# Patient Record
Sex: FEMALE | Race: ASIAN | NOT HISPANIC OR LATINO
[De-identification: names, ages, dates, MRNs, and addresses within clinical notes are randomized per-mention and may not be internally consistent; named-entity substitution may affect disease eponyms.]

---

## 2022-03-02 ENCOUNTER — APPOINTMENT (OUTPATIENT)
Dept: DERMATOLOGY | Facility: CLINIC | Age: 57
End: 2022-03-02
Payer: MEDICAID

## 2022-03-02 ENCOUNTER — LABORATORY RESULT (OUTPATIENT)
Age: 57
End: 2022-03-02

## 2022-03-02 VITALS — WEIGHT: 118 LBS | BODY MASS INDEX: 18.96 KG/M2 | HEIGHT: 66 IN

## 2022-03-02 DIAGNOSIS — D48.9 NEOPLASM OF UNCERTAIN BEHAVIOR, UNSPECIFIED: ICD-10-CM

## 2022-03-02 PROBLEM — Z00.00 ENCOUNTER FOR PREVENTIVE HEALTH EXAMINATION: Status: ACTIVE | Noted: 2022-03-02

## 2022-03-02 PROCEDURE — 99204 OFFICE O/P NEW MOD 45 MIN: CPT

## 2022-03-02 RX ORDER — MUPIROCIN 20 MG/G
2 OINTMENT TOPICAL
Qty: 1 | Refills: 0 | Status: ACTIVE | COMMUNITY
Start: 2022-03-02 | End: 1900-01-01

## 2022-03-03 ENCOUNTER — NON-APPOINTMENT (OUTPATIENT)
Age: 57
End: 2022-03-03

## 2022-03-03 DIAGNOSIS — B02.9 ZOSTER W/OUT COMPLICATIONS: ICD-10-CM

## 2022-03-03 RX ORDER — VALACYCLOVIR 1 G/1
1 TABLET, FILM COATED ORAL 3 TIMES DAILY
Qty: 21 | Refills: 0 | Status: ACTIVE | COMMUNITY
Start: 2022-03-03 | End: 1900-01-01

## 2022-03-24 ENCOUNTER — APPOINTMENT (OUTPATIENT)
Dept: DERMATOLOGY | Facility: CLINIC | Age: 57
End: 2022-03-24

## 2022-05-07 ENCOUNTER — NON-APPOINTMENT (OUTPATIENT)
Age: 57
End: 2022-05-07

## 2024-08-30 ENCOUNTER — OFFICE (OUTPATIENT)
Facility: LOCATION | Age: 59
Setting detail: OPHTHALMOLOGY
End: 2024-08-30
Payer: COMMERCIAL

## 2024-08-30 DIAGNOSIS — H25.13: ICD-10-CM

## 2024-08-30 DIAGNOSIS — H40.013: ICD-10-CM

## 2024-08-30 DIAGNOSIS — H16.223: ICD-10-CM

## 2024-08-30 PROCEDURE — 92250 FUNDUS PHOTOGRAPHY W/I&R: CPT | Performed by: OPTOMETRIST

## 2024-08-30 PROCEDURE — 99213 OFFICE O/P EST LOW 20 MIN: CPT | Performed by: OPTOMETRIST

## 2024-08-30 PROCEDURE — 92083 EXTENDED VISUAL FIELD XM: CPT | Performed by: OPTOMETRIST

## 2024-08-30 ASSESSMENT — CONFRONTATIONAL VISUAL FIELD TEST (CVF)
OS_FINDINGS: FULL
OD_FINDINGS: FULL

## 2025-05-05 ENCOUNTER — EMERGENCY (EMERGENCY)
Facility: HOSPITAL | Age: 60
LOS: 1 days | End: 2025-05-05
Attending: STUDENT IN AN ORGANIZED HEALTH CARE EDUCATION/TRAINING PROGRAM
Payer: COMMERCIAL

## 2025-05-05 VITALS
WEIGHT: 117.95 LBS | DIASTOLIC BLOOD PRESSURE: 77 MMHG | RESPIRATION RATE: 18 BRPM | SYSTOLIC BLOOD PRESSURE: 138 MMHG | TEMPERATURE: 98 F | HEART RATE: 68 BPM | OXYGEN SATURATION: 98 % | HEIGHT: 63 IN

## 2025-05-05 LAB — GAS PNL BLDV: SIGNIFICANT CHANGE UP

## 2025-05-05 PROCEDURE — 93010 ELECTROCARDIOGRAM REPORT: CPT

## 2025-05-05 PROCEDURE — 99285 EMERGENCY DEPT VISIT HI MDM: CPT

## 2025-05-05 RX ORDER — ASPIRIN 325 MG
324 TABLET ORAL ONCE
Refills: 0 | Status: COMPLETED | OUTPATIENT
Start: 2025-05-05 | End: 2025-05-05

## 2025-05-05 RX ADMIN — Medication 324 MILLIGRAM(S): at 23:55

## 2025-05-05 NOTE — ED PROVIDER NOTE - PROGRESS NOTE DETAILS
Kee Nichols, DO: Right-sided EKG showed submillimeter depressions in V5 V6, STEMI consult was placed, bedside POCUS shows no pericardial effusion, no wall motion abnormality, small left-sided pleural effusion

## 2025-05-05 NOTE — ED PROVIDER NOTE - ATTENDING CONTRIBUTION TO CARE
Kee Nichols DO: I have personally performed a face to face medical and diagnostic evaluation of the patient. I have discussed with and reviewed the Resident's and/or ACP's and/or Medical/PA/NP student's note and agree with the History, ROS, Physical Exam and MDM unless otherwise indicated. A brief summary of my personal evaluation and impression can be found below.    59-year-old female with no prior cardiac medical history, history of pulmonary fibrosis? (patient unsure), present ED for chest pain which started at approximately 1 PM.  Patient states that pain has been intermittent since then associated with leg weakness as well as diaphoresis no nausea or vomiting, patient states last episode of chest pain was approximately 10 minutes prior to arrival but has since subsided.  Patient states that currently she is without chest pain.  Patient had an abnormal EKG with submillimeter elevations in 1 and aVL, submillimeter ST depressions in 2 3 aVF as well as T wave inversions in V2 and V3, brought back immediately.  Patient denies similar chest pain in the past, no recent travel, no leg swelling.    CONSTITUTIONAL: NAD  SKIN: Warm dry, normal skin turgor  HEAD: NCAT  EYES: EOMI, PERRLA, no scleral icterus, conjunctiva pink  NECK: Supple; non tender. Full ROM.  CARD: RRR  RESP: No respiratory distress  ABD: non-distended, no abdominal tenderness  MSK: Full ROM, no leg swelling  PSYCH: Cooperative, appropriate.    With an irregular EKG which does not meet STEMI criteria however has some concerning features of possible ischemia, history of new onset chest pain with diaphoresis earlier concern for ACS, Less likely to be PE, no clinical signs of DVT.  Other causes of chest pain such as worsening pulmonary fibrosis infectious etiology less likely, not suspecting aortic pathology or pneumothorax at this time.  Will give aspirin, obtain chest x-ray, ACS workup.  If patient's chest pain returns we will get repeat EKG, patient likely to be admitted for provocative testing.

## 2025-05-06 ENCOUNTER — RESULT REVIEW (OUTPATIENT)
Age: 60
End: 2025-05-06

## 2025-05-06 VITALS
HEART RATE: 66 BPM | RESPIRATION RATE: 21 BRPM | SYSTOLIC BLOOD PRESSURE: 104 MMHG | TEMPERATURE: 98 F | OXYGEN SATURATION: 95 % | DIASTOLIC BLOOD PRESSURE: 60 MMHG

## 2025-05-06 LAB
A1C WITH ESTIMATED AVERAGE GLUCOSE RESULT: 5.7 % — HIGH (ref 4–5.6)
ADD ON TEST-SPECIMEN IN LAB: SIGNIFICANT CHANGE UP
ALBUMIN SERPL ELPH-MCNC: 4.2 G/DL — SIGNIFICANT CHANGE UP (ref 3.3–5)
ALP SERPL-CCNC: 66 U/L — SIGNIFICANT CHANGE UP (ref 40–120)
ALT FLD-CCNC: 14 U/L — SIGNIFICANT CHANGE UP (ref 10–45)
ANION GAP SERPL CALC-SCNC: 11 MMOL/L — SIGNIFICANT CHANGE UP (ref 5–17)
AST SERPL-CCNC: 19 U/L — SIGNIFICANT CHANGE UP (ref 10–40)
BASOPHILS # BLD AUTO: 0.04 K/UL — SIGNIFICANT CHANGE UP (ref 0–0.2)
BASOPHILS NFR BLD AUTO: 0.7 % — SIGNIFICANT CHANGE UP (ref 0–2)
BILIRUB SERPL-MCNC: 0.2 MG/DL — SIGNIFICANT CHANGE UP (ref 0.2–1.2)
BUN SERPL-MCNC: 14 MG/DL — SIGNIFICANT CHANGE UP (ref 7–23)
CALCIUM SERPL-MCNC: 9.3 MG/DL — SIGNIFICANT CHANGE UP (ref 8.4–10.5)
CHLORIDE SERPL-SCNC: 103 MMOL/L — SIGNIFICANT CHANGE UP (ref 96–108)
CHOLEST SERPL-MCNC: 198 MG/DL — SIGNIFICANT CHANGE UP
CO2 SERPL-SCNC: 25 MMOL/L — SIGNIFICANT CHANGE UP (ref 22–31)
CREAT SERPL-MCNC: 0.69 MG/DL — SIGNIFICANT CHANGE UP (ref 0.5–1.3)
D DIMER BLD IA.RAPID-MCNC: 346 NG/ML DDU — HIGH
EGFR: 99 ML/MIN/1.73M2 — SIGNIFICANT CHANGE UP
EGFR: 99 ML/MIN/1.73M2 — SIGNIFICANT CHANGE UP
EOSINOPHIL # BLD AUTO: 0.36 K/UL — SIGNIFICANT CHANGE UP (ref 0–0.5)
EOSINOPHIL NFR BLD AUTO: 6.2 % — HIGH (ref 0–6)
ESTIMATED AVERAGE GLUCOSE: 117 MG/DL — HIGH (ref 68–114)
GLUCOSE SERPL-MCNC: 111 MG/DL — HIGH (ref 70–99)
HCT VFR BLD CALC: 41.6 % — SIGNIFICANT CHANGE UP (ref 34.5–45)
HDLC SERPL-MCNC: 44 MG/DL — LOW
HGB BLD-MCNC: 14.1 G/DL — SIGNIFICANT CHANGE UP (ref 11.5–15.5)
IMM GRANULOCYTES NFR BLD AUTO: 0.7 % — SIGNIFICANT CHANGE UP (ref 0–0.9)
LDLC SERPL-MCNC: 141 MG/DL — HIGH
LIPID PNL WITH DIRECT LDL SERPL: 141 MG/DL — HIGH
LYMPHOCYTES # BLD AUTO: 2.51 K/UL — SIGNIFICANT CHANGE UP (ref 1–3.3)
LYMPHOCYTES # BLD AUTO: 42.9 % — SIGNIFICANT CHANGE UP (ref 13–44)
MCHC RBC-ENTMCNC: 30.2 PG — SIGNIFICANT CHANGE UP (ref 27–34)
MCHC RBC-ENTMCNC: 33.9 G/DL — SIGNIFICANT CHANGE UP (ref 32–36)
MCV RBC AUTO: 89.1 FL — SIGNIFICANT CHANGE UP (ref 80–100)
MONOCYTES # BLD AUTO: 0.67 K/UL — SIGNIFICANT CHANGE UP (ref 0–0.9)
MONOCYTES NFR BLD AUTO: 11.5 % — SIGNIFICANT CHANGE UP (ref 2–14)
NEUTROPHILS # BLD AUTO: 2.23 K/UL — SIGNIFICANT CHANGE UP (ref 1.8–7.4)
NEUTROPHILS NFR BLD AUTO: 38 % — LOW (ref 43–77)
NONHDLC SERPL-MCNC: 155 MG/DL — HIGH
NRBC BLD AUTO-RTO: 0 /100 WBCS — SIGNIFICANT CHANGE UP (ref 0–0)
PLATELET # BLD AUTO: 240 K/UL — SIGNIFICANT CHANGE UP (ref 150–400)
POTASSIUM SERPL-MCNC: 4 MMOL/L — SIGNIFICANT CHANGE UP (ref 3.5–5.3)
POTASSIUM SERPL-SCNC: 4 MMOL/L — SIGNIFICANT CHANGE UP (ref 3.5–5.3)
PROT SERPL-MCNC: 7.3 G/DL — SIGNIFICANT CHANGE UP (ref 6–8.3)
RBC # BLD: 4.67 M/UL — SIGNIFICANT CHANGE UP (ref 3.8–5.2)
RBC # FLD: 12.8 % — SIGNIFICANT CHANGE UP (ref 10.3–14.5)
SODIUM SERPL-SCNC: 139 MMOL/L — SIGNIFICANT CHANGE UP (ref 135–145)
TRIGL SERPL-MCNC: 78 MG/DL — SIGNIFICANT CHANGE UP
TROPONIN T, HIGH SENSITIVITY RESULT: <6 NG/L — SIGNIFICANT CHANGE UP (ref 0–51)
WBC # BLD: 5.85 K/UL — SIGNIFICANT CHANGE UP (ref 3.8–10.5)
WBC # FLD AUTO: 5.85 K/UL — SIGNIFICANT CHANGE UP (ref 3.8–10.5)

## 2025-05-06 PROCEDURE — 85379 FIBRIN DEGRADATION QUANT: CPT

## 2025-05-06 PROCEDURE — 80053 COMPREHEN METABOLIC PANEL: CPT

## 2025-05-06 PROCEDURE — 93005 ELECTROCARDIOGRAM TRACING: CPT | Mod: XU

## 2025-05-06 PROCEDURE — 82947 ASSAY GLUCOSE BLOOD QUANT: CPT

## 2025-05-06 PROCEDURE — 93356 MYOCRD STRAIN IMG SPCKL TRCK: CPT

## 2025-05-06 PROCEDURE — 93016 CV STRESS TEST SUPVJ ONLY: CPT

## 2025-05-06 PROCEDURE — 82553 CREATINE MB FRACTION: CPT

## 2025-05-06 PROCEDURE — 93017 CV STRESS TEST TRACING ONLY: CPT

## 2025-05-06 PROCEDURE — 83605 ASSAY OF LACTIC ACID: CPT

## 2025-05-06 PROCEDURE — 93306 TTE W/DOPPLER COMPLETE: CPT

## 2025-05-06 PROCEDURE — 84484 ASSAY OF TROPONIN QUANT: CPT

## 2025-05-06 PROCEDURE — 80061 LIPID PANEL: CPT

## 2025-05-06 PROCEDURE — 82435 ASSAY OF BLOOD CHLORIDE: CPT

## 2025-05-06 PROCEDURE — A9500: CPT

## 2025-05-06 PROCEDURE — 85025 COMPLETE CBC W/AUTO DIFF WBC: CPT

## 2025-05-06 PROCEDURE — 84132 ASSAY OF SERUM POTASSIUM: CPT

## 2025-05-06 PROCEDURE — 83036 HEMOGLOBIN GLYCOSYLATED A1C: CPT

## 2025-05-06 PROCEDURE — 93308 TTE F-UP OR LMTD: CPT

## 2025-05-06 PROCEDURE — 84295 ASSAY OF SERUM SODIUM: CPT

## 2025-05-06 PROCEDURE — 93018 CV STRESS TEST I&R ONLY: CPT

## 2025-05-06 PROCEDURE — 85014 HEMATOCRIT: CPT

## 2025-05-06 PROCEDURE — 82803 BLOOD GASES ANY COMBINATION: CPT

## 2025-05-06 PROCEDURE — 99285 EMERGENCY DEPT VISIT HI MDM: CPT | Mod: 25

## 2025-05-06 PROCEDURE — 93010 ELECTROCARDIOGRAM REPORT: CPT

## 2025-05-06 PROCEDURE — 99236 HOSP IP/OBS SAME DATE HI 85: CPT | Mod: 25

## 2025-05-06 PROCEDURE — 93306 TTE W/DOPPLER COMPLETE: CPT | Mod: 26

## 2025-05-06 PROCEDURE — 78452 HT MUSCLE IMAGE SPECT MULT: CPT | Mod: 26

## 2025-05-06 PROCEDURE — 85018 HEMOGLOBIN: CPT

## 2025-05-06 PROCEDURE — 93308 TTE F-UP OR LMTD: CPT | Mod: 26

## 2025-05-06 PROCEDURE — 71045 X-RAY EXAM CHEST 1 VIEW: CPT | Mod: 26

## 2025-05-06 PROCEDURE — 71275 CT ANGIOGRAPHY CHEST: CPT | Mod: 26

## 2025-05-06 PROCEDURE — 71275 CT ANGIOGRAPHY CHEST: CPT | Mod: MC

## 2025-05-06 PROCEDURE — 36415 COLL VENOUS BLD VENIPUNCTURE: CPT

## 2025-05-06 PROCEDURE — 78452 HT MUSCLE IMAGE SPECT MULT: CPT | Mod: MC

## 2025-05-06 PROCEDURE — G0378: CPT

## 2025-05-06 PROCEDURE — 82330 ASSAY OF CALCIUM: CPT

## 2025-05-06 PROCEDURE — 82550 ASSAY OF CK (CPK): CPT

## 2025-05-06 PROCEDURE — 71045 X-RAY EXAM CHEST 1 VIEW: CPT

## 2025-05-06 RX ADMIN — Medication 40 MILLIGRAM(S): at 06:33

## 2025-05-06 NOTE — CONSULT NOTE ADULT - SUBJECTIVE AND OBJECTIVE BOX
Cardiology Consult Note   [Please check [email or web address] password: [email or web address]" for cardiology service schedule and contact information]    HPI:  Gianluca Red is a 59 year old Woman with PMH of Pre-DM, HLD, ?ILD, ?GERD who comes in with 12 hours of intermittent midsternal chest pressure associated with a moment of "sweating" and leg weakness per son at bedside. Interview conducted via son as Bulgarian  at the request of patient.     She was in her usual state of health this AM which is described as fully functional, never short of breath with mild-moderate exertion, can walk up multiple flights of stairs, and without any history of coronary disease, any family history of MI, no tobacco, EtOH, or illicit drug use history. Then around 1 pm she developed this midsternal pressure sensation associated with a moment of sweating per son while she was hungry. The episode was brief and resolved but then later around 4-5 pm she had another chest pressure sensation. She had a third one and her older son recommended going to hospital.     In the ED, her chest pain resolved after the initial ECG was taken. The ECG shows submillimeter HEATHER in I, aVL with slight horizontal ST depressions in III, aVF. Right sided ECG was similar but showed TWI in K9H-W4X. She continued to remain asymptomatic. Bedside POCUS by ED performed and was witnessed by me: normal EF, no RWMA. /77 with one reading of 160s systolic. HR 60s NSR. Initial troponin came back <6. CBC WNL, CMP WNL with Cr 0.7. D-dimer elevated at 346. Lactate 1.1 WNL    She has no prior ECG or data at [facility] to review. Does not see a cardiologist outpatient.      Home Meds:  - PPI  - Famotidine    Allergies:  NKDA      ROS:  General: no fatigue, no lethargy  HEENT: No cough, no congestion  CV: as per HPI  Resp: as per HPI  GI: No nausea, no vomiting  MSK: no extremity pain, no decreased ROM  Skin: no rashes, no bruising  Neuro: no confusion, no focal deficits  Psych: no significant anxiety, no hallucinations    PAST MEDICAL & SURGICAL HISTORY:    FAMILY HISTORY:    SOCIAL HISTORY:  unchanged    MEDICATIONS:                    -------------------------------------------------------------------------------------------  PHYSICAL EXAM:  T(C): 36.7 (05-05-25 @ 23:24), Max: 36.7 (05-05-25 @ 23:24)  HR: 68 (05-05-25 @ 23:58) (68 - 68)  BP: 165/75 (05-05-25 @ 23:58) (138/77 - 165/75)  RR: 20 (05-05-25 @ 23:58) (18 - 20)  SpO2: 98% (05-05-25 @ 23:58) (98% - 98%)  Wt(kg): --  I&O's Summary      GENERAL: Patient is alert, awake, and in no acute distress  HEENT: Moist mucous membranes. No conjunctival injection. No JVD  CHEST/LUNG: Clear to auscultation bilaterally; No wheezing. Unlabored respirations.  HEART: Regular rate and rhythm; No murmurs. Radial pulses 2+.  ABDOMEN: Bowel sounds present; Soft, Nontender, Nondistended.   EXTREMITIES:  No lower extremity tenderness. No lower extremity edema.  NEURO: Aox3, no focal deficits    -------------------------------------------------------------------------------------------  LABS:                          14.1   5.85  )-----------( 240      ( 05 May 2025 23:53 )             41.6     05-05    139  |  103  |  14  ----------------------------<  111[H]  4.0   |  25  |  0.69    Ca    9.3      05 May 2025 23:53    TPro  7.3  /  Alb  4.2  /  TBili  0.2  /  DBili  x   /  AST  19  /  ALT  14  /  AlkPhos  66  05-05      CARDIAC MARKERS ( 05 May 2025 23:53 )  <6 ng/L / x     / x     / x     / x     / x                  -------------------------------------------------------------------------------------------    -------------------------------------------------------------------------------------------    Assessment and Recommendations:    #Chest Pain, possibly NSTEMI  Patient presenting with midsternal chest pressure that has been intermittent since 1 pm with most recent episode occurring prior to EMS being called and subsiding by the time she arrived in the waiting room. Her risk factors are 58 yo F, PreDM, HLD. Overall not a significant risk factor burden however will need to obtain delta troponin, obtain TTE, and some form of either anatomic or functional testing. Given her relatively younger age of 59, will opt for anatomic with CCTA if troponins remain WNL. If she has a repeat troponin that is also <6, given low-intermediate pre-test probability, by ACC guidelines this would essentially rule her out for ACS. Her ECG changes are interesting but without a baseline to compare to so may be nonspecific in setting of normal troponin and no RWMA on POCUS.   Recs:  - trend troponins q2-4 hours to peak, obtain CK, CKMB, CKMB%  - obtain proBNP  - repeat ECG with next troponin  - obtain formal TTE in AM   - monitor in CDU  - if troponins negative x2, please order CCTA w/ and w/out contrast (patients with ILD have higher rates of coronary disease)  - please obtain A1C, Lipid profile, LPa, TSH  - replete lytes for K >4.0, Mg >2.0  - please notify cardiology team if worsening chest pain, hemodynamic instability  - maintain active T&S, Hgb > 8; monitor for s/s of bleeding           These are preliminary recommendations. Please see attending attestation for final recommendations.  Cardiology Consult Note   [Please check [email or web address] password: [email or web address]" for cardiology service schedule and contact information]    HPI:  Gianluca Red is a 59 year old Woman with PMH of Pre-DM, HLD, ?ILD, ?GERD who comes in with 12 hours of intermittent midsternal chest pressure associated with a moment of "sweating" and leg weakness per son at bedside. Interview conducted via son as Divehi  at the request of patient.     She was in her usual state of health this AM which is described as fully functional, never short of breath with mild-moderate exertion, can walk up multiple flights of stairs, and without any history of coronary disease, any family history of MI, no tobacco, EtOH, or illicit drug use history. Then around 1 pm she developed this midsternal pressure sensation associated with a moment of sweating per son while she was hungry. The episode was brief and resolved but then later around 4-5 pm she had another chest pressure sensation. She had a third one and her older son recommended going to hospital.     In the ED, her chest pain resolved after the initial ECG was taken. The ECG shows submillimeter HEATHER in I, aVL with slight horizontal ST depressions in III, aVF. Right sided ECG was similar but showed TWI in B4L-P1Y. Cardiology called immediately at 12:01 for possible STEMI and rushed to bedside.    She continued to remain asymptomatic. Bedside POCUS by ED performed and was witnessed by me: normal EF, no RWMA. /77 with one reading of 160s systolic. HR 60s NSR. Initial troponin came back <6. CBC WNL, CMP WNL with Cr 0.7. D-dimer elevated at 346. Lactate 1.1 WNL    She has no prior ECG or [facility] to [facility]. Does not see a cardiologist outpatient.      Home Meds:  - PPI  - Famotidine    Allergies:  NKDA      ROS:  General: no fatigue, no lethargy  HEENT: No cough, no congestion  CV: as per HPI  Resp: as per HPI  GI: No nausea, no vomiting  MSK: no extremity pain, no decreased ROM  Skin: no rashes, no bruising  Neuro: no confusion, no focal deficits  Psych: no significant anxiety, no hallucinations    PAST MEDICAL & SURGICAL HISTORY:    FAMILY HISTORY:    SOCIAL HISTORY:  unchanged    MEDICATIONS:                    -------------------------------------------------------------------------------------------  PHYSICAL EXAM:  T(C): 36.7 (05-05-25 @ 23:24), Max: 36.7 (05-05-25 @ 23:24)  HR: 68 (05-05-25 @ 23:58) (68 - 68)  BP: 165/75 (05-05-25 @ 23:58) (138/77 - 165/75)  RR: 20 (05-05-25 @ 23:58) (18 - 20)  SpO2: 98% (05-05-25 @ 23:58) (98% - 98%)  Wt(kg): --  I&O's Summary      GENERAL: Patient is alert, awake, and in no acute distress  HEENT: Moist mucous membranes. No conjunctival injection. No JVD  CHEST/LUNG: Clear to auscultation bilaterally; No wheezing. Unlabored respirations.  HEART: Regular rate and rhythm; No murmurs. Radial pulses 2+.  ABDOMEN: Bowel sounds present; Soft, Nontender, Nondistended.   EXTREMITIES:  No lower extremity tenderness. No lower extremity edema.  NEURO: Aox3, no focal deficits    -------------------------------------------------------------------------------------------  LABS:                          14.1   5.85  )-----------( 240      ( 05 May 2025 23:53 )             41.6     05-05    139  |  103  |  14  ----------------------------<  111[H]  4.0   |  25  |  0.69    Ca    9.3      05 May 2025 23:53    TPro  7.3  /  Alb  4.2  /  TBili  0.2  /  DBili  x   /  AST  19  /  ALT  14  /  AlkPhos  66  05-05      CARDIAC MARKERS ( 05 May 2025 23:53 )  <6 ng/L / x     / x     / x     / x     / x                  -------------------------------------------------------------------------------------------    -------------------------------------------------------------------------------------------    Assessment and Recommendations:    #Chest Pain, possibly NSTEMI  Patient presenting with midsternal chest pressure that has been intermittent since 1 pm with most recent episode occurring prior to EMS being called and subsiding by the time she arrived in the waiting room. Her risk factors are 58 yo F, PreDM, HLD. Overall not a significant risk factor burden however will need to obtain delta troponin, obtain TTE, and some form of either anatomic or functional testing. Given her relatively younger age of 59, will opt for anatomic with CCTA if troponins remain WNL. If she has a repeat troponin that is also <6, given low-intermediate pre-test probability, by ACC guidelines this would essentially rule her out for ACS. Her ECG changes are interesting but without a baseline to compare to so may be nonspecific in setting of normal troponin and no RWMA on POCUS.   Recs:  - trend troponins q2-4 hours to peak, obtain CK, CKMB, CKMB%  - obtain proBNP  - repeat ECG with next troponin  - obtain formal TTE in AM   - monitor in CDU  - if troponins negative x2, please order CCTA w/ and w/out contrast (patients with ILD have higher rates of coronary disease)  - please obtain A1C, Lipid profile, LPa, TSH  - replete lytes for K >4.0, Mg >2.0  - please notify cardiology team if worsening chest pain, hemodynamic instability  - maintain active T&S, Hgb > 8; monitor for s/s of bleeding           These are preliminary recommendations. Please see attending attestation for final recommendations.  Cardiology Consult Note   [Please check [email or web address] password: "[email or web address]" for cardiology service schedule and contact information]    HPI:  Gianluca Red is a 59 year old Woman with PMH of Pre-DM, HLD, ?ILD, ?GERD who comes in with 12 hours of intermittent midsternal chest pressure associated with a moment of "sweating" and leg weakness per son at bedside. Interview conducted via son as Icelandic  at the request of patient.     She was in her usual state of health this AM which is described as fully functional, never short of breath with mild-moderate exertion, can walk up multiple flights of stairs, and without any history of coronary disease, any family history of MI, no tobacco, EtOH, or illicit drug use history. Then around 1 pm she developed this midsternal pressure sensation associated with a moment of sweating per son while she was hungry. The episode was brief and resolved but then later around 4-5 pm she had another chest pressure sensation. She had a third one and her older son recommended going to hospital.     In the ED, her chest pain resolved after the initial ECG was taken. The ECG shows submillimeter HETAHER in I, aVL with slight horizontal ST depressions in III, aVF. Right sided ECG was similar but showed TWI in L6T-W2T. Cardiology called immediately at 12:01 for possible STEMI and rushed to bedside.    She continued to remain asymptomatic. Bedside POCUS by ED performed and was witnessed by me: normal EF, no RWMA. /77 with one reading of 160s systolic. HR 60s NSR. Initial troponin came back <6. CBC WNL, CMP WNL with Cr 0.7. D-dimer elevated at 346. Lactate 1.1 WNL    She has no prior ECG or [facility] to [facility]. Does not see a cardiologist outpatient.      Home Meds:  - PPI  - Famotidine    Allergies:  NKDA      ROS:  General: no fatigue, no lethargy  HEENT: No cough, no congestion  CV: as per HPI  Resp: as per HPI  GI: No nausea, no vomiting  MSK: no extremity pain, no decreased ROM  Skin: no rashes, no bruising  Neuro: no confusion, no focal deficits  Psych: no significant anxiety, no hallucinations    PAST MEDICAL & SURGICAL HISTORY:    FAMILY HISTORY:    SOCIAL HISTORY:  unchanged    MEDICATIONS:                    -------------------------------------------------------------------------------------------  PHYSICAL EXAM:  T(C): 36.7 (05-05-25 @ 23:24), Max: 36.7 (05-05-25 @ 23:24)  HR: 68 (05-05-25 @ 23:58) (68 - 68)  BP: 165/75 (05-05-25 @ 23:58) (138/77 - 165/75)  RR: 20 (05-05-25 @ 23:58) (18 - 20)  SpO2: 98% (05-05-25 @ 23:58) (98% - 98%)  Wt(kg): --  I&O's Summary      GENERAL: Patient is alert, awake, and in no acute distress  HEENT: Moist mucous membranes. No conjunctival injection. No JVD  CHEST/LUNG: Clear to auscultation bilaterally; No wheezing. Unlabored respirations.  HEART: Regular rate and rhythm; No murmurs. Radial pulses 2+.  ABDOMEN: Bowel sounds present; Soft, Nontender, Nondistended.   EXTREMITIES:  No lower extremity tenderness. No lower extremity edema.  NEURO: Aox3, no focal deficits    -------------------------------------------------------------------------------------------  LABS:                          14.1   5.85  )-----------( 240      ( 05 May 2025 23:53 )             41.6     05-05    139  |  103  |  14  ----------------------------<  111[H]  4.0   |  25  |  0.69    Ca    9.3      05 May 2025 23:53    TPro  7.3  /  Alb  4.2  /  TBili  0.2  /  DBili  x   /  AST  19  /  ALT  14  /  AlkPhos  66  05-05      CARDIAC MARKERS ( 05 May 2025 23:53 )  <6 ng/L / x     / x     / x     / x     / x                  -------------------------------------------------------------------------------------------    -------------------------------------------------------------------------------------------    Assessment and Recommendations:    #Chest Pain, possibly NSTEMI  Patient presenting with midsternal chest pressure that has been intermittent since 1 pm with most recent episode occurring prior to EMS being called and subsiding by the time she arrived in the waiting room. Her risk factors are 58 yo F, PreDM, HLD. Overall not a significant risk factor burden however will need to obtain delta troponin, obtain TTE, and some form of either anatomic or functional testing. Given her relatively younger age of 59, will opt for anatomic with CCTA if troponins remain WNL. If she has a repeat troponin that is also <6, given low-intermediate pre-test probability, by ACC guidelines this would essentially rule her out for ACS. Her ECG changes are interesting but without a baseline to compare to so may be nonspecific in setting of normal troponin and no RWMA on POCUS.   Recs:  - trend troponins q2-4 hours to peak, obtain CK, CKMB, CKMB%  - obtain proBNP  - repeat ECG with next troponin  - obtain formal TTE in AM   - monitor in CDU  - if troponins negative x2, please order CCTA w/ and w/out contrast (patients with ILD have higher rates of coronary disease). Update: pt underwent CTPE that was negative. Would prefer to avoid two contrast loads and so recommend switching CCTA to an exercise stress test plain)  - please obtain A1C, Lipid profile, LPa, TSH  - replete lytes for K >4.0, Mg >2.0  - please notify cardiology team if worsening chest pain, hemodynamic instability  - maintain active T&S, Hgb > 8; monitor for s/s of bleeding           These are preliminary recommendations. Please see attending attestation for final recommendations.

## 2025-05-06 NOTE — ED CDU PROVIDER DISPOSITION NOTE - PATIENT PORTAL LINK FT
You can access the FollowMyHealth Patient Portal offered by [facility] by registering at the following website: [email or web address]. By joining [facility]’s FollowMyHealth portal, you will also be able to view your health information using other applications (apps) compatible with our system.

## 2025-05-06 NOTE — ED ADULT NURSE NOTE - OBJECTIVE STATEMENT
59 y.o female 59 y.o female intermittent chest pain since 1300 a/w b/l leg weakness. Denies NV, HA, syncope, SOB, edema, travel. Denies cardiac hx. PMH pulmonary fibrosis

## 2025-05-06 NOTE — ED CDU PROVIDER INITIAL DAY NOTE - CONSTITUTIONAL, MLM
Well appearing female, awake, alert, oriented to person, place, time/situation and in no apparent distress. normal...

## 2025-05-06 NOTE — ED CDU PROVIDER DISPOSITION NOTE - NSFOLLOWUPINSTRUCTIONS_ED_ALL_ED_FT
Follow up with your PMD and/or cardiologist within 48-72 hours.   *Bring a copy of all printed lab/test results with you.*    Recommend Aspirin 81mg over the counter daily until further evaluation.   Take all of your other medications as previously prescribed.     Return to the ED for worsening chest pain, shortness of breath, loss of consciousness, or any other concerns. 1. Stay hydrated.  2. Continue Current Home Medications  3. Follow up with your PCP in 1-2 days. Please follow up with Cardiologist Dr. Fall within 1-2 weeks.    Phan Fall  Cardiovascular Disease  [address], [address]  [place] [address]  Phone: [phone]  Fax: [phone]       (Bring printed results to your doctor visit).  4. Return if symptoms, worsen, fever, weakness, chest pain, difficulty breathing, dizziness and all other concerns.      Please follow up the following with your doctors:  high hgb a1c 5.8 (prediabetes)- recommend decrease carb/sugar intake, eat healthy, exercise  high cholesterol, low HDL (good cholesterol)- eat healthy, follow low cholesterol diet  on CT imaging: "Scattered areas of mosaic attenuation throughout the lungs which is due to air trapping versus small airways or small vessels disease." Also incidental Calcification in liver.  Abnormal EKG.      Chest Pain    WHAT YOU NEED TO KNOW:    Chest pain can be caused by a range of conditions, from not serious to life-threatening. Chest pain can be a symptom of a digestive problem, such as acid reflux or a stomach ulcer. An anxiety attack or a strong emotion, such as anger, can also cause chest pain. Infection, inflammation, or a fracture in the bones or cartilage in your chest can cause pain or discomfort. Sometimes chest pain or pressure is caused by poor blood flow to your heart (angina). Chest pain may also be caused by life-threatening conditions such as a heart attack or blood clot in your lungs.    DISCHARGE INSTRUCTIONS:    Call your local emergency number (911 in the [place]) or have someone call if:    You have any of the following signs of a heart attack:  Squeezing, pressure, or pain in your chest    You may also have any of the following:  Discomfort or pain in your back, neck, jaw, stomach, or arm    Shortness of breath    Nausea or vomiting    Lightheadedness or a sudden cold sweat    Seek care immediately if:    You have chest discomfort that gets worse, even with medicine.    You cough or vomit blood.    Your bowel movements are black or bloody.    You cannot stop vomiting, or it hurts to swallow.  Call your doctor if:    You have questions or concerns about your condition or care.    Medicines:    Medicines may be given to treat the cause of your chest pain. Examples include pain medicine, anxiety medicine, or medicines to increase blood flow to your heart.    Do not take certain medicines without asking your healthcare provider first. These include NSAIDs, herbal or vitamin supplements, and hormones, such as estrogen or progestin.    Take your medicine as directed. Contact your healthcare provider if you think your medicine is not helping or if you have side effects. Tell your provider if you are allergic to any medicine. Keep a list of the medicines, vitamins, and herbs you take. Include the amounts, and when and why you take them. Bring the list or the pill bottles to follow-up visits. Carry your medicine list with you in case of an emergency.  Healthy living tips: If the cause of your chest pain is known, your healthcare provider will give you specific guidelines to follow. The following are general healthy guidelines:    Do not smoke. Nicotine and other chemicals in cigarettes and cigars can cause lung and heart damage. Ask your healthcare provider for information if you currently smoke and need help to quit. E-cigarettes or smokeless tobacco still contain nicotine. Talk to your healthcare provider before you use these products.    Choose a variety of healthy foods as often as possible. Include fresh, frozen, or canned fruits and vegetables. Also include low-fat dairy products, fish, chicken (without skin), and lean meats. Your healthcare provider or a dietitian can help you create meal plans. You may need to avoid certain foods or drinks if your pain is caused by a digestion problem.  Healthy Foods      Lower your sodium (salt) intake. Limit foods that are high in sodium, such as canned foods, salty snacks, and cold cuts. If you add salt when you cook food, do not add more at the table. Choose low-sodium canned foods as much as possible.        Drink plenty of water every day. Water helps your body to control temperature and blood pressure. Ask your healthcare provider how much water you should drink every day.    Ask about activity. Your healthcare provider will tell you which activities to limit or avoid. Ask when you can drive, return to work, and have sex. Ask about the best exercise plan for you.    Maintain a healthy weight. Ask your healthcare provider what a healthy weight is for you. Ask him or her to help you create a safe weight loss plan if you are overweight.    Ask about vaccines you may need. Your healthcare provider can tell you which vaccines you need, and when to get them. The following vaccines help prevent diseases that can become serious for a person with a heart condition:  The influenza (flu) vaccine is given each year. Get a flu vaccine as soon as recommended, usually in September or October.    The pneumonia vaccine is usually given every 5 years. Your healthcare provider may recommend the pneumonia vaccine if you are 65 or older.    COVID-19 vaccines are given to adults as a shot. At least 1 dose of an updated vaccine is recommended for all adults. COVID-19 vaccines are updated throughout the year. Adults 65 or older need a second dose of updated vaccine at least 4 months after the first dose. Your healthcare provider can help you schedule all needed doses as updated vaccines become available.  Prevent Heart Disease   Follow up with your doctor within 72 hours, or as directed: You may need to return for more tests to find the cause of your chest pain. You may be referred to a specialist, such as a cardiologist or gastroenterologist. Write down your questions so you remember to ask them during your visits.    © [employer] 1973, 2025    	  back to top            © [employer] 1973, 2025

## 2025-05-06 NOTE — ED CDU PROVIDER INITIAL DAY NOTE - NS ED ATTENDING STATEMENT MOD
I have seen and examined this patient and fully participated in the care of this patient as the teaching attending.  The service was shared with the GARCÍA.  I reviewed and verified the documentation.

## 2025-05-06 NOTE — ED CDU PROVIDER DISPOSITION NOTE - CARE PROVIDER_API CALL
Phan Fall  Cardiovascular Disease  [address], [address]  [place] [address]  Phone: [phone]  Fax: [phone]  Follow Up Time:

## 2025-05-06 NOTE — ED CDU PROVIDER DISPOSITION NOTE - CLINICAL COURSE
59-year-old female with no prior cardiac medical history, history of pulmonary fibrosis? (patient unsure), present ED for chest pain which started at approximately 1 PM.  Patient states that pain has been intermittent since then associated with leg weakness as well as diaphoresis no nausea or vomiting, patient states last episode of chest pain was approximately 10 minutes prior to arrival but has since subsided.  Patient states that currently she is without chest pain.  Patient had an abnormal EKG with submillimeter elevations in 1 and aVL, submillimeter ST depressions in 2 3 aVF as well as T wave inversions in V2 and V3. While in the emergency department EKGs including cardiology was consulted for above described ischemic ekg changes, bedside POCUS showed no focal wall motion abnormalities or pericardial effusion. Troponin less than 6 x 2.  Cardiology recommended serial troponins, comprehensive TTE and cardiac CT.  in cdu, 59-year-old female with no prior cardiac medical history, history of pulmonary fibrosis? (patient unsure), present ED for chest pain which started at approximately 1 PM.  Patient states that pain has been intermittent since then associated with leg weakness as well as diaphoresis no nausea or vomiting, patient states last episode of chest pain was approximately 10 minutes prior to arrival but has since subsided.  Patient states that currently she is without chest pain.  Patient had an abnormal EKG with submillimeter elevations in 1 and aVL, submillimeter ST depressions in 2 3 aVF as well as T wave inversions in V2 and V3. While in the emergency department EKGs including cardiology was consulted for above described ischemic ekg changes, bedside POCUS showed no focal wall motion abnormalities or pericardial effusion. Troponin less than 6 x 2.  Cardiology recommended serial troponins, comprehensive TTE and cardiac CT.  in cdu, pt did well. no events on tele. echo and nuc stress normal. pt seen by Simón- ok to f/up outpatient. pt symptoms resolved.

## 2025-05-06 NOTE — ED CDU PROVIDER INITIAL DAY NOTE - ATTENDING CONTRIBUTION TO CARE
Kee Nichols DO: I have personally performed a face to face medical and diagnostic evaluation of the patient. I have discussed with and reviewed the Resident's and/or ACP's and/or Medical/PA/NP student's note and agree with the History, ROS, Physical Exam and MDM unless otherwise indicated. A brief summary of my personal evaluation and impression can be found below.     Physical exam, HPI per prior ED Provider note    MDM as above unless otherwise specified

## 2025-05-06 NOTE — CONSULT NOTE ADULT - SUBJECTIVE AND OBJECTIVE BOX
DATE OF SERVICE: 05-06-25 @ 15:34    CHIEF COMPLAINT:Patient is a 59y old  Female who presents with a chief complaint of     HISTORY OF PRESENT ILLNESS:HPI:  59-year-old female with no prior cardiac medical history, history of pulmonary fibrosis? (patient unsure), present ED for chest pain which started at approximately 1 PM.  Patient states that pain has been intermittent since then associated with leg weakness as well as diaphoresis no nausea or vomiting, patient states last episode of chest pain was approximately 10 minutes prior to arrival but has since subsided.  Patient states that currently she is without chest pain.  Patient had an abnormal EKG with submillimeter elevations in 1 and aVL, submillimeter ST depressions in 2 3 aVF as well as T wave inversions in V2 and V3,  While in the emergency department EKGs including cardiology was consulted for above described ischemic ekg changes, bedside POCUS showed no focal wall motion abnormalities or pericardial effusion.  Troponin less than 6 x 2.         PAST MEDICAL & SURGICAL HISTORY:          MEDICATIONS:          pantoprazole    Tablet 40 milliGRAM(s) Oral before breakfast          FAMILY HISTORY:      Non-contributory    SOCIAL HISTORY:    [ ] not a smoker    Allergies    No Known Allergies    Intolerances    	    REVIEW OF SYSTEMS:  CONSTITUTIONAL: No fever  EYES: No eye pain, visual disturbances, or discharge  ENMT:  No difficulty hearing, tinnitus  NECK: No pain or stiffness  RESPIRATORY: No cough, wheezing,  CARDIOVASCULAR: + chest pain, no palpitations, passing out, dizziness, or leg swelling  GASTROINTESTINAL:  No nausea, vomiting, diarrhea or constipation. No melena.  GENITOURINARY: No dysuria, hematuria  NEUROLOGICAL: No stroke like symptoms  SKIN: No burning or lesions   ENDOCRINE: No heat or cold intolerance  MUSCULOSKELETAL: No joint pain or swelling  PSYCHIATRIC: No  anxiety, mood swings  HEME/LYMPH: No bleeding gums  ALLERGY AND IMMUNOLOGIC: No hives or eczema	    All other ROS negative    PHYSICAL EXAM:  T(C): 36.7 (05-06-25 @ 12:07), Max: 36.7 (05-05-25 @ 23:24)  HR: 77 (05-06-25 @ 12:07) (60 - 77)  BP: 120/78 (05-06-25 @ 12:07) (117/72 - 165/75)  RR: 18 (05-06-25 @ 12:07) (16 - 20)  SpO2: 96% (05-06-25 @ 12:07) (96% - 98%)  Wt(kg): --  I&O's Summary      Appearance: Normal	  HEENT:   Normal oral mucosa, EOMI	  Cardiovascular:  S1 S2, No JVD,    Respiratory: Lungs clear to auscultation	  Psychiatry: Alert  Gastrointestinal:  Soft, Non-tender, + BS	  Skin: No rashes   Neurologic: Non-focal  Extremities:  No edema  Vascular: Peripheral pulses palpable    	    	  	  CARDIAC MARKERS:  Labs personally reviewed by me                                  14.1   5.85  )-----------( 240      ( 05 May 2025 23:53 )             41.6     05-05    139  |  103  |  14  ----------------------------<  111[H]  4.0   |  25  |  0.69    Ca    9.3      05 May 2025 23:53    TPro  7.3  /  Alb  4.2  /  TBili  0.2  /  DBili  x   /  AST  19  /  ALT  14  /  AlkPhos  66  05-05          EKG: Personally reviewed by me - NSR with inferior and anterior wall ischemia  Radiology: Personally reviewed by me - CTA chest IMPRESSION:  1. No pulmonary embolism.  2. Scattered areas of mosaic attenuation throughout the lungs which is due to air trapping versus small airways or small vessels disease.        Assessment /Plan:   59-year-old female with no prior cardiac medical history present ED for chest pain     1. Chest pain - ischemic EKG - Plan for NM stress test   - TTE    2. PreDM - check A1C, diet and exercise           Differential diagnosis and plan of care discussed with patient after the evaluation. Counseling on diet, nutritional counseling, weight management, exercise and medication compliance was done.   Advanced care planning/advanced directives discussed with patient/family. DNR status including forceful chest compressions to attempt to restart the heart, ventilator support/artificial breathing, electric shock, artificial nutrition, health care proxy, Molst form all discussed with pt. Pt wishes to consider. Sixteen minutes spent on discussing advanced directives.           Phan Fall DO [facility]  Cardiovascular Medicine  [address] [place] [address], [address]  Office [phone]  Available via call/text on Microsoft Teams

## 2025-05-06 NOTE — ED CDU PROVIDER DISPOSITION NOTE - ATTENDING CONTRIBUTION TO CARE
I , Dr Robbie Carrington has seen and evaluated the patient.  The patient reports improvement in symptoms.  The patient is stable for discharge home and will follow up with their primary physician and cardiology .

## 2025-05-06 NOTE — ED CDU PROVIDER INITIAL DAY NOTE - OBJECTIVE STATEMENT
59-year-old female with no prior cardiac medical history, history of pulmonary fibrosis? (patient unsure), present ED for chest pain which started at approximately 1 PM.  Patient states that pain has been intermittent since then associated with leg weakness as well as diaphoresis no nausea or vomiting, patient states last episode of chest pain was approximately 10 minutes prior to arrival but has since subsided.  Patient states that currently she is without chest pain.  Patient had an abnormal EKG with submillimeter elevations in 1 and aVL, submillimeter ST depressions in 2 3 aVF as well as T wave inversions in V2 and V3,  While in the emergency department EKGs including cardiology was consulted for above described ischemic ekg changes, bedside POCUS showed no focal wall motion abnormalities or pericardial effusion.  Troponin less than 6 x 2.  Cardiology recommended serial troponins, comprehensive TTE and cardiac CT.

## 2025-05-06 NOTE — ED CDU PROVIDER DISPOSITION NOTE - CARE PROVIDERS DIRECT ADDRESSES
,[email or web address]

## 2025-05-06 NOTE — ED CDU PROVIDER INITIAL DAY NOTE - PROGRESS NOTE DETAILS
araceli pt cp free awaiting echo and stress CDU NOTE AMBIKA Reyna: pt resting comfortably, feels well without complaint. NAD VSS. no events on tele. CDU NOTE AMBIKA Reyna: pt resting comfortably, feels well without complaint. NAD VSS. no events on tele.  Son at bedside translating at her request.  Nuc stress and echo normal.  pt cleared by Cards.  As per Dr. Carrington, pt stable for d/c home.

## 2025-05-06 NOTE — ED ADULT NURSE REASSESSMENT NOTE - NS ED NURSE REASSESS COMMENT FT1
1545-paatient came back from the stress test. Verified with Jeannie about troponin q 4hrs order. Discontinued as per Jeannie APPLE.
report received from MIGUELINA gutiérrez. pt is A&x4, speech is clear, following commands. pt awaiting CTC. pt on cm.
0745-patient received alert & oriented x4, Came back from echo. Denies chest pain, sob, dizziness> Tolerated getting OOB & walking. Maintained on continuous cardiac monitoring.